# Patient Record
Sex: FEMALE
[De-identification: names, ages, dates, MRNs, and addresses within clinical notes are randomized per-mention and may not be internally consistent; named-entity substitution may affect disease eponyms.]

---

## 2017-10-09 ENCOUNTER — HOSPITAL ENCOUNTER (EMERGENCY)
Dept: HOSPITAL 31 - C.ER | Age: 62
Discharge: HOME | End: 2017-10-09
Payer: COMMERCIAL

## 2017-10-09 VITALS
TEMPERATURE: 98.6 F | SYSTOLIC BLOOD PRESSURE: 130 MMHG | OXYGEN SATURATION: 98 % | HEART RATE: 82 BPM | DIASTOLIC BLOOD PRESSURE: 64 MMHG

## 2017-10-09 VITALS — BODY MASS INDEX: 29 KG/M2

## 2017-10-09 VITALS — RESPIRATION RATE: 20 BRPM

## 2017-10-09 DIAGNOSIS — B02.9: ICD-10-CM

## 2017-10-09 DIAGNOSIS — B34.9: Primary | ICD-10-CM

## 2017-10-09 NOTE — C.PDOC
History Of Present Illness


The patient is a 63yo female, presents to the ED for evaluation of sinus 

congestion and rash for the past 8 days. Patient reports rash was vesicular but 

has now dried. Patient denies any fever, vomiting. Patient states she has not 

visited her PCP for her symptoms. She offers no other medical complaints. 


Time Seen by Provider: 10/09/17 14:20


Chief Complaint (Nursing): Cough, Cold, Congestion


History Per: Patient


History/Exam Limitations: no limitations


Onset/Duration Of Symptoms: Days


Current Symptoms Are (Timing): Still Present





Past Medical History


Reviewed: Historical Data, Nursing Documentation, Vital Signs


Vital Signs: 


 Last Vital Signs











Temp  98.6 F   10/09/17 15:34


 


Pulse  82   10/09/17 15:34


 


Resp  20   10/09/17 15:34


 


BP  130/64   10/09/17 15:34


 


Pulse Ox  98   10/09/17 17:04














- Medical History


PMH: Arthritis (LEGS), Colonic Polyps (ADENOMATOUS AND HYPERPLASTIC), HTN, 

Hyperlipidemia


   Denies: Fractures, Chronic Kidney Disease, TIA


Surgical History: 


   Denies: Endoscopy


Family History: States: Unknown Family Hx, Hypertension





- Social History


Hx Tobacco Use: No


Hx Alcohol Use: No


Hx Substance Use: No





- Immunization History


Hx Tetanus Toxoid Vaccination: No


Hx Influenza Vaccination: No


Hx Pneumococcal Vaccination: No





Review Of Systems


Constitutional: Negative for: Fever, Chills


ENT: Positive for: Nose Congestion


Gastrointestinal: Negative for: Nausea, Vomiting


Skin: Positive for: Rash





Physical Exam





- Physical Exam


Appears: Non-toxic, No Acute Distress


Skin: Rash (dry, sporadic papules noted on T7 dermatome.)


Eye(s): bilateral: Normal Inspection


Nose: Other (mild sinus tenderness noted)


Neck: Normal ROM


Cardiovascular: Rhythm Regular


Respiratory: Normal Breath Sounds, No Wheezing





ED Course And Treatment


O2 Sat by Pulse Oximetry: 98 (RA)


Pulse Ox Interpretation: Normal





Medical Decision Making


Medical Decision Making: 


Impression: 63yo female w/ rash and sinus congestion


Plan:


-- Motrin 600 mg PO





Patient to be discharged home w/ Zithromax and Motrin.





Disposition





- Disposition


Referrals: 


 Service [Outside]


[facility] [Outside]


Disposition: HOME/ ROUTINE


Disposition Time: 15:10


Condition: GOOD


Additional Instructions: 


Thank you for letting us take care of you today. Your provider was Dr. Christensen. You were treated for a sinus infection and shingles.  The emergency 

medical care you received today was directed at your acute symptoms. If you 

were prescribed any medication, please fill it and take as directed. It may 

take several days for your symptoms to resolve. Return to the Emergency 

Department if your symptoms worsen, do not improve, or if you have any other 

problems.





Please contact your doctor or call one of the physicians/clinics you have been 

referred to that are listed on the Patient Visit Information form that is 

included in your discharge packet. Bring any paperwork you were given at 

discharge with you along with any medications you are taking to your follow up 

visit. Our treatment cannot replace ongoing medical care by a primary care 

provider (PCP) outside of the emergency department.





Thank you for allowing the [employer] team to be part of your care today.














Follow up in the clinic in 2-3 days for re-evaluation and further management.


Prescriptions: 


Azithromycin [Zithromax] 250 mg PO DAILY #6 tab


Ibuprofen [Motrin] 600 mg PO Q6 PRN #20 tab


 PRN Reason: Pain, Moderate (4-7)


Instructions:  Shingles (ED), Upper Respiratory Infection (ED)





- Clinical Impression


Clinical Impression: 


 Viral disease, Shingles








- Scribe Statement


The provider has reviewed the documentation as recorded by the Kristyibpark Quiñonez





All medical record entries made by the Karthik were at my direction and 

personally dictated by me. I have reviewed the chart and agree that the record 

accurately reflects my personal performance of the history, physical exam, 

medical decision making, and the department course for this patient. I have 

also personally directed, reviewed, and agree with the discharge instructions 

and disposition.

## 2017-12-06 ENCOUNTER — HOSPITAL ENCOUNTER (EMERGENCY)
Dept: HOSPITAL 31 - C.ER | Age: 62
Discharge: HOME | End: 2017-12-06
Payer: COMMERCIAL

## 2017-12-06 VITALS — DIASTOLIC BLOOD PRESSURE: 85 MMHG | HEART RATE: 54 BPM | SYSTOLIC BLOOD PRESSURE: 155 MMHG

## 2017-12-06 VITALS — RESPIRATION RATE: 20 BRPM | TEMPERATURE: 98.5 F | OXYGEN SATURATION: 97 %

## 2017-12-06 VITALS — BODY MASS INDEX: 29 KG/M2

## 2017-12-06 DIAGNOSIS — R09.81: ICD-10-CM

## 2017-12-06 DIAGNOSIS — R07.89: Primary | ICD-10-CM

## 2017-12-06 LAB
ALBUMIN/GLOB SERPL: 1.4 {RATIO} (ref 1–2.1)
ALP SERPL-CCNC: 101 U/L (ref 38–126)
ALT SERPL-CCNC: 54 U/L (ref 9–52)
AST SERPL-CCNC: 35 U/L (ref 14–36)
BASOPHILS # BLD AUTO: 0.1 K/UL (ref 0–0.2)
BASOPHILS NFR BLD: 0.9 % (ref 0–2)
BILIRUB SERPL-MCNC: 0.5 MG/DL (ref 0.2–1.3)
BUN SERPL-MCNC: 19 MG/DL (ref 7–17)
CALCIUM SERPL-MCNC: 8.7 MG/DL (ref 8.6–10.4)
CHLORIDE SERPL-SCNC: 104 MMOL/L (ref 98–107)
CO2 SERPL-SCNC: 28 MMOL/L (ref 22–30)
EOSINOPHIL # BLD AUTO: 0.1 K/UL (ref 0–0.7)
EOSINOPHIL NFR BLD: 0.8 % (ref 0–4)
ERYTHROCYTE [DISTWIDTH] IN BLOOD BY AUTOMATED COUNT: 13.2 % (ref 11.5–14.5)
GLOBULIN SER-MCNC: 3 GM/DL (ref 2.2–3.9)
GLUCOSE SERPL-MCNC: 123 MG/DL (ref 65–105)
HCT VFR BLD CALC: 40.6 % (ref 34–47)
LYMPHOCYTES # BLD AUTO: 2.2 K/UL (ref 1–4.3)
LYMPHOCYTES NFR BLD AUTO: 30.1 % (ref 20–40)
MCH RBC QN AUTO: 30.4 PG (ref 27–31)
MCHC RBC AUTO-ENTMCNC: 34.4 G/DL (ref 33–37)
MCV RBC AUTO: 88.4 FL (ref 81–99)
MONOCYTES # BLD: 0.6 K/UL (ref 0–0.8)
MONOCYTES NFR BLD: 7.9 % (ref 0–10)
NRBC BLD AUTO-RTO: 0 % (ref 0–2)
PLATELET # BLD: 220 K/UL (ref 130–400)
PMV BLD AUTO: 8.6 FL (ref 7.2–11.7)
POTASSIUM SERPL-SCNC: 3.6 MMOL/L (ref 3.6–5.2)
PROT SERPL-MCNC: 7.2 G/DL (ref 6.3–8.3)
SODIUM SERPL-SCNC: 140 MMOL/L (ref 132–148)
WBC # BLD AUTO: 7.3 K/UL (ref 4.8–10.8)

## 2017-12-06 NOTE — RAD
HISTORY:

chest pain  



COMPARISON:

Chest radiographs 05/09/2017.



TECHNIQUE:

Chest PA and lateral



FINDINGS:



LUNGS:

No active pulmonary disease.



PLEURA:

No significant pleural effusion identified. No pneumothorax apparent.



CARDIOVASCULAR:

Normal.



OSSEOUS STRUCTURES:

No significant abnormalities.



VISUALIZED UPPER ABDOMEN:

Normal.



OTHER FINDINGS:

None.



IMPRESSION:

No interval acute cardiopulmonary disease appreciated.

## 2017-12-06 NOTE — C.PDOC
History Of Present Illness





63 y/o female presents to ED with c/o mid-sternal chest pain for 2 days. 

Patient describes pain as constant, not changing with exertion or change of 

position. Denies cough, SOB, diaphoresis, nausea, vomiting, or other associated 

symptoms. Patient reports chronic nasal congestion with a history of post nasal 

drip, which she states is unchanged. 


Time Seen by Provider: 12/06/17 13:28


Chief Complaint (Nursing): Chest Pain


History Per: Patient


History/Exam Limitations: no limitations


Onset/Duration Of Symptoms: Days


Current Symptoms Are (Timing): Still Present


Associated Symptoms: denies: Dyspnea, Diaphoresis


Recent travel outside of the [place]: No





Past Medical History


Reviewed: Historical Data, Nursing Documentation, Vital Signs


Vital Signs: 


 Last Vital Signs











Temp  98.5 F   12/06/17 13:19


 


Pulse  60   12/06/17 13:19


 


Resp  20   12/06/17 13:19


 


BP  139/72   12/06/17 13:19


 


Pulse Ox  97   12/06/17 14:30














- Medical History


PMH: Arthritis (LEGS), Bronchitis, Colonic Polyps (ADENOMATOUS AND HYPERPLASTIC)

, HTN, Hyperlipidemia


Family History: States: Unknown Family Hx, Hypertension





- Social History


Hx Tobacco Use: No


Hx Alcohol Use: No


Hx Substance Use: No





- Immunization History


Hx Tetanus Toxoid Vaccination: No


Hx Influenza Vaccination: No


Hx Pneumococcal Vaccination: No





Review Of Systems


Except As Marked, All Systems Reviewed And Found Negative.


Constitutional: Negative for: Fever, Chills


Cardiovascular: Positive for: Chest Pain.  Negative for: Palpitations


Respiratory: Negative for: Cough, Shortness of Breath, Wheezing


Gastrointestinal: Negative for: Nausea, Vomiting, Abdominal Pain


Skin: Negative for: Rash





Physical Exam





- Physical Exam


Appears: Non-toxic, No Acute Distress, Other (comfortable)


Skin: Normal Color, Warm, Dry


Head: Atraumatic, Normacephalic


Oral Mucosa: Moist


Chest: Symmetrical


Cardiovascular: Rhythm Regular


Respiratory: Normal Breath Sounds, No Accessory Muscle Use, No Rales, No Rhonchi

, No Wheezing


Gastrointestinal/Abdominal: Soft, No Tenderness


Back: Normal Inspection


Extremity: Normal ROM, Capillary Refill (< 2 sec.)


Neurological/Psych: Oriented x3, Normal Speech, Normal Cognition





ED Course And Treatment





- Laboratory Results


Result Diagrams: 


 12/06/17 13:43





 12/06/17 13:43


Lab Interpretation: Normal


ECG: Interpreted By Me ( )


ECG Rhythm: Sinus Rhythm


ECG Interpretation: No Acute Changes


O2 Sat by Pulse Oximetry: 97 (RA)


Pulse Ox Interpretation: Normal





- Radiology


CXR: Interpreted by Me


CXR Interpretation: Yes: No Acute Disease


Progress Note: EKG, bloodwork, aspirin.


Reevaluation Time: 15:15


Reassessment Condition: Improved (Patient states that she feels better but is 

still having nasal congestion.)





Disposition


Counseled Patient/Family Regarding: Studies Performed, Diagnosis, Need For 

Followup, Rx Given





- Disposition


Referrals: 


[facility] [Outside]


Disposition: HOME/ ROUTINE


Disposition Time: 15:16


Condition: STABLE


Prescriptions: 


Fluticasone Nasal [Flonase] 1 spr NS BID #1 spr


Instructions:  Noncardiac Chest Pain (ED)


Forms:  [facility] (English)





- Clinical Impression


Clinical Impression: 


 Non-cardiac chest pain, Nasal congestion








- Scribe Statement


The provider has reviewed the documentation as recorded by the Scribe





SM





All medical record entries made by the Scribe were at my direction and 

personally dictated by me. I have reviewed the chart and agree that the record 

accurately reflects my personal performance of the history, physical exam, 

medical decision making, and the department course for this patient. I have 

also personally directed, reviewed, and agree with the discharge instructions 

and disposition.

## 2017-12-20 ENCOUNTER — HOSPITAL ENCOUNTER (EMERGENCY)
Dept: HOSPITAL 31 - C.ER | Age: 62
Discharge: HOME | End: 2017-12-20
Payer: COMMERCIAL

## 2017-12-20 VITALS — RESPIRATION RATE: 16 BRPM | DIASTOLIC BLOOD PRESSURE: 84 MMHG | HEART RATE: 70 BPM | SYSTOLIC BLOOD PRESSURE: 139 MMHG

## 2017-12-20 VITALS — TEMPERATURE: 98.8 F | OXYGEN SATURATION: 97 %

## 2017-12-20 VITALS — BODY MASS INDEX: 29 KG/M2

## 2017-12-20 DIAGNOSIS — G47.00: Primary | ICD-10-CM

## 2017-12-20 NOTE — C.PDOC
History Of Present Illness


62 year old female presents to the ED with c/o insomnia for 3 days. Patient has 

no other associated symptoms at this time. She is pending PMD appointment on 1/ 16.


History obtained via . 





VIA TRANS








CO INSOMNIA X 3 DAYS. NO OTHER ASSOC SX. PENDING PMD APPT 1/16.





EXAM


NEG





MDM


PT ADVISED THIS PROVIDER DOES NOT WRITE PRESCRIPTIONS FOR INSOMNIA. FU PMD AS 

SCHEDULED.


Chief Complaint (Nursing): Medical Clearance


History Per: Patient


History/Exam Limitations: other ( used )


Onset/Duration Of Symptoms: Days


Current Symptoms Are (Timing): Still Present


Additional History Per: Patient





Past Medical History


Reviewed: Historical Data, Nursing Documentation, Vital Signs


Vital Signs: 


 Last Vital Signs











Temp  98.8 F   12/20/17 17:24


 


Pulse  70   12/20/17 18:33


 


Resp  16   12/20/17 18:33


 


BP  139/84   12/20/17 18:33


 


Pulse Ox  97   12/20/17 18:27














- Medical History


PMH: Arthritis (LEGS), Bronchitis, Colonic Polyps (ADENOMATOUS AND HYPERPLASTIC)

, HTN, Hyperlipidemia


Surgical History: No Surg Hx


Family History: States: Hypertension





- Social History


Hx Tobacco Use: No


Hx Alcohol Use: No


Hx Substance Use: No





- Immunization History


Hx Tetanus Toxoid Vaccination: No


Hx Influenza Vaccination: No


Hx Pneumococcal Vaccination: No





Review Of Systems


Constitutional: Positive for: Other (insomnia )





Physical Exam





- Physical Exam


Appears: Non-toxic, No Acute Distress


Skin: Normal Color, Warm, Dry


Neurological/Psych: Oriented x3, Normal Speech, Normal Cognition


Gait: Steady





ED Course And Treatment


O2 Sat by Pulse Oximetry: 97 (on RA)


Pulse Ox Interpretation: Normal





Medical Decision Making


Medical Decision Making: 


PT ADVISED THIS PROVIDER DOES NOT WRITE PRESCRIPTIONS FOR INSOMNIA. FU PMD AS 

SCHEDULED.





Disposition


Counseled Patient/Family Regarding: Diagnosis, Need For Followup





- Disposition


Referrals: 


[facility],P[facility] [Other]


Disposition: HOME/ ROUTINE


Disposition Time: 18:25


Condition: GOOD


Instructions:  Insomnia (ED)


Forms:  [facility] (English)


Print Language: Colombian





- Clinical Impression


Clinical Impression: 


 Insomnia








- Scribe Statement


The provider has reviewed the documentation as recorded by the Scribe (Tomasa Joseph)


Provider Attestation: 








All medical record entries made by the Scribe were at my direction and 

personally dictated by me. I have reviewed the chart and agree that the record 

accurately reflects my personal performance of the history, physical exam, 

medical decision making, and the department course for this patient. I have 

also personally directed, reviewed, and agree with the discharge instructions 

and disposition.

## 2018-02-21 ENCOUNTER — HOSPITAL ENCOUNTER (EMERGENCY)
Dept: HOSPITAL 31 - C.ER | Age: 63
Discharge: HOME | End: 2018-02-21
Payer: COMMERCIAL

## 2018-02-21 VITALS — BODY MASS INDEX: 29 KG/M2

## 2018-02-21 VITALS — OXYGEN SATURATION: 95 % | RESPIRATION RATE: 18 BRPM

## 2018-02-21 VITALS — HEART RATE: 69 BPM | DIASTOLIC BLOOD PRESSURE: 75 MMHG | TEMPERATURE: 98.1 F | SYSTOLIC BLOOD PRESSURE: 138 MMHG

## 2018-02-21 DIAGNOSIS — M54.5: Primary | ICD-10-CM

## 2018-02-21 PROCEDURE — 99284 EMERGENCY DEPT VISIT MOD MDM: CPT

## 2018-02-21 PROCEDURE — 96372 THER/PROPH/DIAG INJ SC/IM: CPT

## 2018-02-21 PROCEDURE — 73502 X-RAY EXAM HIP UNI 2-3 VIEWS: CPT

## 2018-02-21 PROCEDURE — 72100 X-RAY EXAM L-S SPINE 2/3 VWS: CPT

## 2018-02-21 NOTE — RAD
PROCEDURE:  Radiographs of the Lumbar Spine.



HISTORY:

Right lower pain s/p fall 2 weeks ago



COMPARISON:

None available.



FINDINGS:



BONES:

Alignment appears satisfactory. Osseous demineralization. Loss of 

vertebral body height at the L1 vertebral level possibly mild wedge 

fracture deformity, age indeterminate . No acute displaced fracture 

or subluxation identified. 



DISC SPACES:

Unremarkable.



OTHER FINDINGS:

None.



IMPRESSION:

Loss of vertebral body height at the L1 vertebral level possibly mild 

wedge fracture deformity, age indeterminate .

## 2018-02-21 NOTE — C.PDOC
History Of Present Illness


Pt c/o right buttock area pain after falling 2 weeks ago. 


Time Seen by Provider: 02/21/18 09:59


Chief Complaint (Nursing): Lower Extremity Problem/Injury


History Per: Patient


Onset/Duration Of Symptoms: Days (about 2 weeks)


Current Symptoms Are (Timing): Still Present


Severity: Moderate


Additional History Per: Prior Records





- Hip


Description Of Injury: Fell





Past Medical History


Reviewed: Historical Data, Nursing Documentation, Vital Signs


Vital Signs: 





 Last Vital Signs











Temp  98.1 F   02/21/18 12:45


 


Pulse  69   02/21/18 12:45


 


Resp  18   02/21/18 12:45


 


BP  138/75   02/21/18 12:45


 


Pulse Ox  95   02/21/18 12:45














- Medical History


PMH: Arthritis (LEGS), Bronchitis, Colonic Polyps (ADENOMATOUS AND HYPERPLASTIC)

, HTN, Hyperlipidemia


Surgical History: 


Family History: States: Unknown Family Hx, Hypertension





- Social History


Hx Tobacco Use: No


Hx Alcohol Use: No


Hx Substance Use: No





- Immunization History


Hx Tetanus Toxoid Vaccination: No


Hx Influenza Vaccination: No


Hx Pneumococcal Vaccination: No





Review Of Systems


Except As Marked, All Systems Reviewed And Found Negative.


Constitutional: Negative for: Fever


Cardiovascular: Negative for: Chest Pain


Respiratory: Negative for: Shortness of Breath


Gastrointestinal: Negative for: Vomiting, Abdominal Pain


Genitourinary: Negative for: Dysuria, Incontinence


Musculoskeletal: Positive for: Back Pain (right lower).  Negative for: Neck Pain


Skin: Negative for: Rash


Neurological: Negative for: Weakness, Numbness





Physical Exam





- Physical Exam


Appears: Non-toxic, No Acute Distress


Skin: Normal Color, Warm, Dry, No Rash


Head: Atraumatic, Normacephalic


Eye(s): bilateral: Normal Inspection, PERRL, EOMI


Neck: Normal ROM, No Midline Cervical Tenderness, No Step Off Deformity, Supple


Cardiovascular: Rhythm Regular


Respiratory: Normal Breath Sounds, No Accessory Muscle Use


Gastrointestinal/Abdominal: Soft, No Tenderness


Back: No CVA Tenderness, No Vertebral Tenderness, Paraspinal Tenderness (right 

lower)


Extremity: Normal ROM, No Pedal Edema, No Calf Tenderness, No Deformity, No 

Swelling


Extremity: Bilateral: Normal Color And Temperature


Pulses: Right Dorsalis Pedis: Normal


Neurological/Psych: Oriented x3, Normal Motor, Normal Sensation





ED Course And Treatment


O2 Sat by Pulse Oximetry: 95


Pulse Ox Interpretation: Normal





- Other Rad


  ** LS spine x-rays


X-Ray: Interpreted by Me, Viewed By Me


Interpretation: DJD. No acute fx.





  ** Right hip x-rays


X-Ray: Interpreted by Me, Viewed By Me


Interpretation: No acute fx or dislocation.


Reassessment Condition: Improved





Disposition


Counseled Patient/Family Regarding: Studies Performed, Diagnosis, Need For 

Followup, Rx Given





- Disposition


Referrals: 


[facility] [Outside]


Disposition: HOME/ ROUTINE


Disposition Time: 12:56


Condition: IMPROVED


Additional Instructions: 


Follow up with your doctor or in the clinic within 1-2 weeks for further 

evaluation and treatment. Return to the ER if you develop redness, swelling, 

weakness, numbness, worsening of symptoms or if you have any other concerns. 


Prescriptions: 


Naproxen 375 mg PO BID PRN #20 tablet


 PRN Reason: Pain, Moderate (4-7)


Instructions:  Hip Pain (DC)


Forms:  Gen Discharge Inst Singaporean, General Discharge Instructions


Print Language: Tamazight





- Clinical Impression


Clinical Impression: 


 Right buttock pain

## 2018-08-27 ENCOUNTER — HOSPITAL ENCOUNTER (EMERGENCY)
Dept: HOSPITAL 31 - C.ER | Age: 63
Discharge: HOME | End: 2018-08-27
Payer: COMMERCIAL

## 2018-08-27 VITALS
SYSTOLIC BLOOD PRESSURE: 147 MMHG | TEMPERATURE: 99 F | HEART RATE: 66 BPM | RESPIRATION RATE: 20 BRPM | OXYGEN SATURATION: 98 % | DIASTOLIC BLOOD PRESSURE: 77 MMHG

## 2018-08-27 VITALS — BODY MASS INDEX: 29 KG/M2

## 2018-08-27 DIAGNOSIS — M79.651: Primary | ICD-10-CM

## 2018-08-27 NOTE — C.PDOC
History Of Present Illness


64 y/o female presents to ED with c/o right thigh pain since earlier today. 

Patient states she did not take pain medication and denies trauma, leg numbness

, back pain or any other complaints at this time. 


Time Seen by Provider: 08/27/18 15:31


Chief Complaint (Nursing): Lower Extremity Problem/Injury


History Per: Patient


History/Exam Limitations: no limitations


Onset/Duration Of Symptoms: Hrs


Current Symptoms Are (Timing): Still Present





Past Medical History


Reviewed: Historical Data, Nursing Documentation, Vital Signs


Vital Signs: 


 Last Vital Signs











Temp  99 F   08/27/18 15:28


 


Pulse  66   08/27/18 15:28


 


Resp  20   08/27/18 15:28


 


BP  147/77   08/27/18 15:28


 


Pulse Ox  98   08/27/18 17:49














- Medical History


PMH: Arthritis (LEGS), Bronchitis, Colonic Polyps (ADENOMATOUS AND HYPERPLASTIC)

, HTN, Hyperlipidemia


Surgical History: No Surg Hx


Family History: States: Hypertension





- Social History


Hx Tobacco Use: No


Hx Alcohol Use: No


Hx Substance Use: No





- Immunization History


Hx Tetanus Toxoid Vaccination: No


Hx Influenza Vaccination: No


Hx Pneumococcal Vaccination: No





Review Of Systems


Musculoskeletal: Positive for: Leg Pain


Skin: Negative for: Rash


Neurological: Negative for: Weakness, Numbness





Physical Exam





- Physical Exam


Appears: Non-toxic, No Acute Distress


Skin: Warm, Dry, No Rash


Head: Atraumatic, Normacephalic


Eye(s): bilateral: Normal Inspection


Oral Mucosa: Moist


Extremity: No Tenderness, No Pedal Edema, Capillary Refill (<2 seconds), No 

Deformity


Extremity: Bilateral: Normal ROM


Neurological/Psych: Oriented x3, Normal Motor, Normal Sensation





ED Course And Treatment


O2 Sat by Pulse Oximetry: 98 (RA)


Pulse Ox Interpretation: Normal





Disposition





- Disposition


Referrals: 


[facility] [Outside]


[facility] [Outside]


Disposition: HOME/ ROUTINE


Disposition Time: 15:55


Condition: GOOD


Additional Instructions: 





PAULA COLBERT, thank you for letting us take care of you today. Your 

provider was Mat Christensen DO and you were treated for LEG PAIN. The 

emergency medical care you received today was directed at your acute symptoms. 

If you were prescribed any medication, please fill it and take as directed. It 

may take several days for your symptoms to resolve. Return to the Emergency 

Department if your symptoms worsen, do not improve, or if you have any other 

problems.





Please contact your doctor or call one of the physicians/clinics you have been 

referred to that are listed on the Patient Visit Information form that is 

included in your discharge packet. Bring any paperwork you were given at 

discharge with you along with any medications you are taking to your follow up 

visit. Our treatment cannot replace ongoing medical care by a primary care 

provider outside of the emergency department.





Thank you for allowing the [facility] team to be part of your care today.











Follow up with the clinic in 3-5 days for re-evaluation and further management.


Prescriptions: 


Cyclobenzaprine [Flexeril] 5 mg PO Q6 PRN #20 tab


 PRN Reason: Muscle Spasm


Ibuprofen [Motrin] 600 mg PO Q6 PRN #20 tab


 PRN Reason: Pain, Moderate (4-7)


Instructions:  Muscle Strain (DC)


Forms:  [facility] (English)





- Clinical Impression


Clinical Impression: 


 Leg pain








- Scribe Statement


The provider has reviewed the documentation as recorded by the Kristyibpark Mcmanus





All medical record entries made by the Kristyibpark were at my direction and 

personally dictated by me. I have reviewed the chart and agree that the record 

accurately reflects my personal performance of the history, physical exam, 

medical decision making, and the department course for this patient. I have 

also personally directed, reviewed, and agree with the discharge instructions 

and disposition.